# Patient Record
Sex: MALE | Race: BLACK OR AFRICAN AMERICAN | Employment: OTHER | URBAN - METROPOLITAN AREA
[De-identification: names, ages, dates, MRNs, and addresses within clinical notes are randomized per-mention and may not be internally consistent; named-entity substitution may affect disease eponyms.]

---

## 2017-02-03 ENCOUNTER — TELEPHONE (OUTPATIENT)
Dept: SURGERY | Age: 77
End: 2017-02-03

## 2017-04-04 ENCOUNTER — TELEPHONE (OUTPATIENT)
Dept: ONCOLOGY | Age: 77
End: 2017-04-04

## 2017-04-04 NOTE — TELEPHONE ENCOUNTER
Pt s sister returned call from nurse and she wanted Dr. Hagan So to know her brother moved to [place] and to cancel his appointment.

## 2017-04-04 NOTE — TELEPHONE ENCOUNTER
[employer]  Medical Oncology at [facility]    04/04/17- Phone call placed to pt to remind pt to have labs drawn prior to his follow up appointment with . Voicemail left for patient.

## 2017-04-05 NOTE — TELEPHONE ENCOUNTER
04/05/17- Phone call placed to patients sister- she stated patient moved to [place]- she will return phone call with his contact information. Needing to see if patient needs assistance establishing care with new medical oncologist.     Returned patients phone call at new home number- voicemail left. New mobile- is not working just rings.

## 2017-04-05 NOTE — TELEPHONE ENCOUNTER
Pts sister called back with pts contact numbers. She stated his home number is [phone] and is mobile number is [phone].

## 2017-11-28 ENCOUNTER — DOCUMENTATION ONLY (OUTPATIENT)
Dept: FAMILY MEDICINE CLINIC | Age: 77
End: 2017-11-28

## 2017-11-28 NOTE — PROGRESS NOTES
[address] [place] [address] Registry status request was put on Dr [facility] desk to process